# Patient Record
Sex: FEMALE | Race: WHITE
[De-identification: names, ages, dates, MRNs, and addresses within clinical notes are randomized per-mention and may not be internally consistent; named-entity substitution may affect disease eponyms.]

---

## 2018-06-04 ENCOUNTER — HOSPITAL ENCOUNTER (OUTPATIENT)
Dept: HOSPITAL 62 - RAD | Age: 53
End: 2018-06-04
Attending: PHYSICIAN ASSISTANT
Payer: COMMERCIAL

## 2018-06-04 DIAGNOSIS — M25.552: Primary | ICD-10-CM

## 2018-06-04 DIAGNOSIS — M25.452: ICD-10-CM

## 2018-06-04 NOTE — RADIOLOGY REPORT (SQ)
EXAM DESCRIPTION:  MRI RT LOWER JOINT WITHOUT



COMPLETED DATE/TIME:  6/4/2018 9:16 am



REASON FOR STUDY:  PAIN IN RIGHT HIP M25.552  PAIN IN LEFT HIP



COMPARISON:  None.



TECHNIQUE:  Righthip images acquired and stored on PACS. Multiplanar images to include fat sensitive 
sequences as T1, fluid sensitive sequences as T2/STIR and gradient echo sequences. Large FOV fat and 
fluid sensitive sequences include pelvis and opposite hip.



LIMITATIONS:  None.



FINDINGS:  BONE CORTEX AND MARROW:  No generalized marrow replacement. No occult fracture. No worriso
me bone lesions.

TARGETED HIP:

FEMORAL HEAD: No occult fracture. No osteophytes or subchondral cysts. Normal sphericity of femoral h
ead/neck junction. No acetabular dysplasia. No evidence femoroacetabular impingement.  Small joint ef
fusion.  Effusion.

ACETABULUM: No acetabular dysplasia. No subchondral cysts.

LABRUM: No loss of cartilage or delamination. Labrum is intact. No paralabral cysts.

TROCHANTER: No trochanteric bursal effusion.  No edema/fluid at the insertions of the gluteus medius 
and gluteus minimus.

OPPOSITE HIP: Limited evaluation.  No worrisome bone lesions.  No significant effusion.

PELVIS, LOWER LUMBAR SPINE, SACROILIAC JOINTS:

PELVIS : No insufficiency/stress fractures.  No significant degenerative changes.  Sacroiliac joints 
normal.

L SPINE: No significant osteophytes or degenerative changes of the visualized lumbar spine.

MUSCLES AND SOFT TISSUES: Adductors and piriformis normal. Abductors and greater trochanteric bursa n
ormal without edema or fluid. Iliopsoas bursa without fluid. Hamstring attachments without edema or t
ear.

PELVIC SOFT TISSUES: No masses or adenopathy.

SCIATIC NERVE: Identified, without masses or abnormal signal.

OTHER: No other significant finding.



IMPRESSION:  Small joint effusion.  No other significant finding.



TECHNICAL DOCUMENTATION:  JOB ID:  [id number]

 2011 [employer]- All Rights Reserved



Reading location - IP/workstation name: [email or web address]

## 2020-03-23 ENCOUNTER — HOSPITAL ENCOUNTER (OUTPATIENT)
Dept: HOSPITAL 62 - WI | Age: 55
End: 2020-03-23
Attending: PHYSICIAN ASSISTANT
Payer: COMMERCIAL

## 2020-03-23 DIAGNOSIS — M53.3: Primary | ICD-10-CM

## 2020-03-23 DIAGNOSIS — M85.88: ICD-10-CM

## 2020-03-23 PROCEDURE — 77080 DXA BONE DENSITY AXIAL: CPT

## 2020-03-23 NOTE — WOMENS IMAGING REPORT
EXAM DESCRIPTION:  BONE DENSITY HIP/SPINE



COMPLETED DATE/TIME:  3/23/2020 9:51 am



REASON FOR STUDY:  M53.3 BONE DENSITY M53.3  SACROCOCCYGEAL DISORDERS, NOT ELSEWHERE CLASSIFIED



COMPARISON:   None.



TECHNIQUE:  Dual-Energy X-ray Absorptiometry (DEXA) of the AP Spine and Hip.



LIMITATIONS:  None.



FINDINGS:  LUMBAR SPINE:

The bone mineral density (BMD) measured from L1-L4 in the AP projection correlates with a T-score of 
0.4, which is normal as defined by the World Health Organization.

BMD Change vs Baseline:  N/A

HIP:

The bone mineral density (BMD) measured in the left hip correlates with a T-score of -1.1, which is o
steopenia as defined by the World Health Organization.

BMD Change vs Baseline:  N/A

10 year Fracture Risk Assessment:

Major Osteoporotic Fracture:  Not available.

Hip Fracture:  Not available.



IMPRESSION:  1. LUMBAR SPINE WHO CLASSIFICATION: NORMAL.

2. HIP WHO CLASSIFICATION: OSTEOPENIA.

OVERALL ASSESSMENT:

WHO CLASSIFICATION:  OSTEOPENIA.



COMMENT:  The World Health Organization defines low BMD as follows:

T-score:

Normal:  Greater than -1.0

Osteopenia: Between -1.0 and -2.5

Osteoporosis:  Less than -2.5 without fractures

Established osteoporosis:  Less than -2.5 with fractures

In general, you may wish to consider:

Diagnosis          Treatment                     Follow-up DEXA

Normal BMD      Prevention                    2-3 years

Osteopenia       Prevention/Therapy        1-2 years

Osteoporosis     Therapy                        Yearly



TECHNICAL DOCUMENTATION:  JOB ID:  [id number]

 2011 [employer]- All Rights Reserved



Reading location - IP/workstation name: [email or web address]

## 2020-10-02 ENCOUNTER — HOSPITAL ENCOUNTER (OUTPATIENT)
Dept: HOSPITAL 62 - END | Age: 55
Discharge: HOME | End: 2020-10-02
Attending: INTERNAL MEDICINE
Payer: COMMERCIAL

## 2020-10-02 VITALS — DIASTOLIC BLOOD PRESSURE: 66 MMHG | SYSTOLIC BLOOD PRESSURE: 110 MMHG

## 2020-10-02 DIAGNOSIS — Z88.1: ICD-10-CM

## 2020-10-02 DIAGNOSIS — K29.80: ICD-10-CM

## 2020-10-02 DIAGNOSIS — Z03.818: ICD-10-CM

## 2020-10-02 DIAGNOSIS — Z79.899: ICD-10-CM

## 2020-10-02 DIAGNOSIS — M06.9: ICD-10-CM

## 2020-10-02 DIAGNOSIS — E03.9: ICD-10-CM

## 2020-10-02 DIAGNOSIS — K21.9: ICD-10-CM

## 2020-10-02 DIAGNOSIS — K29.50: Primary | ICD-10-CM

## 2020-10-02 DIAGNOSIS — Z79.1: ICD-10-CM

## 2020-10-02 DIAGNOSIS — K44.9: ICD-10-CM

## 2020-10-02 PROCEDURE — 43239 EGD BIOPSY SINGLE/MULTIPLE: CPT

## 2020-10-02 PROCEDURE — C9803 HOPD COVID-19 SPEC COLLECT: HCPCS

## 2020-10-02 PROCEDURE — 88305 TISSUE EXAM BY PATHOLOGIST: CPT

## 2020-10-02 PROCEDURE — 87635 SARS-COV-2 COVID-19 AMP PRB: CPT

## 2020-10-02 PROCEDURE — 00731 ANES UPR GI NDSC PX NOS: CPT

## 2020-10-02 NOTE — OPERATIVE REPORT
Operative Report


DATE OF SURGERY: 10/02/20


Operative Report: 


The risks benefits and alternatives of the procedure explained to the patient in

detail and informed consent is obtained.A  GIF Olympus video scope was inserted 

into the patient's mouth and hypopharynx, the esophagus is identified intubated 

and insufflated, the scope was then advanced through the esophagus stomach and 

duodenum, retroflexion maneuver is done, the esophagus stomach and first and 

second portions of the duodenum examined


PREOPERATIVE DIAGNOSIS: Gastroesophageal reflux disease


POSTOPERATIVE DIAGNOSIS: Esophagitis status post biopsy.  Gastritis status post 

biopsy.  Duodenitis status post biopsy


OPERATION: EGD with biopsy


SURGEON: AFTAB OTOOLE


ANESTHESIA: LMAC


TISSUE REMOVED OR ALTERED: As noted above.


COMPLICATIONS: 





None.


ESTIMATED BLOOD LOSS: None.


INTRAOPERATIVE FINDINGS: As noted above.


PROCEDURE: 





Patient tolerated the procedure well.


No immediate postprocedure complications are noted.


Patient is discharged in good condition.


Discharge date October 2, 2020.


Discharge diet: Regular.


Discharge activity: Regular.


2 to 3-week follow-up to discuss findings.


Patient is instructed to call the office or proceed to the emergency room should

there be any further problems or questions.  Wait on the pathology.